# Patient Record
Sex: FEMALE | Race: WHITE
[De-identification: names, ages, dates, MRNs, and addresses within clinical notes are randomized per-mention and may not be internally consistent; named-entity substitution may affect disease eponyms.]

---

## 2021-03-09 ENCOUNTER — HOSPITAL ENCOUNTER (OUTPATIENT)
Dept: HOSPITAL 47 - ORWHC2ENDO | Age: 76
Discharge: HOME | End: 2021-03-09
Attending: SURGERY
Payer: MEDICARE

## 2021-03-09 VITALS — TEMPERATURE: 97.1 F

## 2021-03-09 VITALS — SYSTOLIC BLOOD PRESSURE: 131 MMHG | DIASTOLIC BLOOD PRESSURE: 71 MMHG | HEART RATE: 72 BPM

## 2021-03-09 VITALS — RESPIRATION RATE: 16 BRPM

## 2021-03-09 VITALS — BODY MASS INDEX: 29.5 KG/M2

## 2021-03-09 DIAGNOSIS — Z87.891: ICD-10-CM

## 2021-03-09 DIAGNOSIS — Z79.899: ICD-10-CM

## 2021-03-09 DIAGNOSIS — I10: ICD-10-CM

## 2021-03-09 DIAGNOSIS — E78.5: ICD-10-CM

## 2021-03-09 DIAGNOSIS — K63.5: ICD-10-CM

## 2021-03-09 DIAGNOSIS — R19.5: ICD-10-CM

## 2021-03-09 DIAGNOSIS — K57.30: ICD-10-CM

## 2021-03-09 DIAGNOSIS — D12.0: Primary | ICD-10-CM

## 2021-03-09 DIAGNOSIS — Z85.828: ICD-10-CM

## 2021-03-09 DIAGNOSIS — Z80.3: ICD-10-CM

## 2021-03-09 PROCEDURE — 88305 TISSUE EXAM BY PATHOLOGIST: CPT

## 2021-03-09 PROCEDURE — 45382 COLONOSCOPY W/CONTROL BLEED: CPT

## 2021-03-09 PROCEDURE — 45385 COLONOSCOPY W/LESION REMOVAL: CPT

## 2021-03-09 NOTE — P.GSHP
History of Present Illness


H&P Date: 03/09/21


Chief Complaint: Blood in stool





75-year-old female recently had a positive cologuard test.  He does not see any 

blood in her stool.  No bowel complaints.  No family history of colon cancer.  

She has not had a colonoscopy.





Past Medical History


Past Medical History: Cancer, Hyperlipidemia, Hypertension


Additional Past Medical History / Comment(s): SKIN CANCER RT WRIST.  + COLOGARD 

TEST


History of Any Multi-Drug Resistant Organisms: None Reported


Additional Past Surgical History / Comment(s): SX TO REMOVED SKIN CANCER RT 

WRIST


Past Anesthesia/Blood Transfusion Reactions: No Reported Reaction


Past Psychological History: No Psychological Hx Reported


Smoking Status: Former smoker


Past Alcohol Use History: Occasional


Additional Past Alcohol Use History / Comment(s): QUIT SMOKING 1990


Past Drug Use History: None Reported





- Past Family History


  ** Mother


Family Medical History: Cancer


Additional Family Medical History / Comment(s): BREAST





Medications and Allergies


                                Home Medications











 Medication  Instructions  Recorded  Confirmed  Type


 


Hydrochlorothiazide 12.5 mg PO DAILY 03/03/21 03/03/21 History





[hydroCHLOROthiazide]    


 


Lisinopril [Prinivil] 10 mg PO DAILY 03/03/21 03/03/21 History


 


Metoprolol Tartrate [Lopressor] 50 mg PO DAILY 03/03/21 03/03/21 History


 


Simvastatin [Zocor] 20 mg PO HS 03/03/21 03/03/21 History


 


amLODIPine [Norvasc] 5 mg PO DAILY 03/03/21 03/03/21 History








                                    Allergies











Allergy/AdvReac Type Severity Reaction Status Date / Time


 


No Known Allergies Allergy   Verified 03/03/21 15:38














Surgical - Exam


                                   Vital Signs











Temp Pulse Resp BP Pulse Ox


 


 97.1 F L  76   20   169/81   98 


 


 03/09/21 09:10  03/09/21 09:10  03/09/21 09:10  03/09/21 09:10  03/09/21 09:10

















Physical exam:


General: Well-developed, well-nourished


HEENT: Normocephalic, sclerae nonicteric


Abdomen: Nontender, nondistended


Extremities: No edema


Neuro: Alert and oriented





Assessment and Plan


(1) Positive colorectal cancer screening using Cologuard test


Narrative/Plan: 


Proceed with colonoscopy


Current Visit: Yes   Status: Acute   Code(s): R19.5 - OTHER FECAL ABNORMALITIES 

  SNOMED Code(s): 212744763

## 2021-03-09 NOTE — P.PCN
Date of Procedure: 03/09/21


Procedure(s) Performed: 


PREOPERATIVE DIAGNOSIS: Positive cologuard test


POSTOPERATIVE DIAGNOSIS: Cecal polyp 2, hepatic flexure polyp, sigmoid polyp, 

diverticulosis


PROCEDURE: Colonoscopy with snare polypectomy and clip placement


ANESTHESIA: MAC


SURGEON: Blas Salmon M.D.


SPECIMENS: Polyps


ENDOSCOPIC PROCEDURE:  The patient was placed on the endoscopy table in the left

decubitus position.  The Olympus colonoscope was inserted into the anus and 

passed under direct visualization to the base of the cecum.  The appendiceal 

orifice was visualized.  From that point the scope was slowly withdrawn 

inspecting all surfaces carefully.  The patient had 2 polyps in the cecum.  Both

fairly small.  The first polyp that we removed had some bleeding afterwards.  

Spot cautery did not correct the bleeding and a clip was deployed there.  No 

further bleeding seen.  A third polyp was seen at the hepatic flexure and also 

removed using the snare with cautery technique.  The remainder of the transverse

and descending colon appear normal.  In the sigmoid colon at 30 cm a small polyp

was seen and removed using the snare with cautery technique.  The remainder of 

the sigmoid and rectum as well as.  There was mild diverticulosis. Digital 

rectal examination was normal.  The patient was taken to the recovery room in 

stable condition per anesthesia guidelines.


RECOMMENDATIONS: Await biopsy results.